# Patient Record
Sex: MALE | Race: WHITE
[De-identification: names, ages, dates, MRNs, and addresses within clinical notes are randomized per-mention and may not be internally consistent; named-entity substitution may affect disease eponyms.]

---

## 2018-11-27 ENCOUNTER — HOSPITAL ENCOUNTER (OUTPATIENT)
Dept: HOSPITAL 92 - BICULT | Age: 63
Discharge: HOME | End: 2018-11-27
Attending: INTERNAL MEDICINE
Payer: COMMERCIAL

## 2018-11-27 DIAGNOSIS — K76.0: ICD-10-CM

## 2018-11-27 DIAGNOSIS — Z90.49: ICD-10-CM

## 2018-11-27 DIAGNOSIS — R10.13: Primary | ICD-10-CM

## 2018-11-27 PROCEDURE — 76705 ECHO EXAM OF ABDOMEN: CPT

## 2018-11-27 NOTE — ULT
RIGHT UPPER QUADRANT ULTRASOUND:

 

Date:  11/27/18 

 

HISTORY:  

Elevated LFTs. 

 

FINDINGS:

The liver demonstrates increased echogenicity consistent with fatty infiltration. No focal mass or in
trahepatic ductal dilatation is seen. The patient is post cholecystectomy. The pancreas is not well v
isualized due to overlying bowel gas. The common duct measures 5.0 mm in diameter. The right kidney i
s normal. No free fluid is seen in the right upper quadrant. 

 

IMPRESSION: 

1.  Fatty liver. 

2.  Status post cholecystectomy. 

 

 

POS: OFF